# Patient Record
Sex: FEMALE
[De-identification: names, ages, dates, MRNs, and addresses within clinical notes are randomized per-mention and may not be internally consistent; named-entity substitution may affect disease eponyms.]

---

## 2020-11-23 ENCOUNTER — FORM ENCOUNTER (OUTPATIENT)
Age: 65
End: 2020-11-23

## 2020-11-24 ENCOUNTER — APPOINTMENT (OUTPATIENT)
Dept: PULMONOLOGY | Facility: CLINIC | Age: 65
End: 2020-11-24
Payer: MEDICARE

## 2020-11-24 ENCOUNTER — NON-APPOINTMENT (OUTPATIENT)
Age: 65
End: 2020-11-24

## 2020-11-24 DIAGNOSIS — U07.1 COVID-19: ICD-10-CM

## 2020-11-24 PROBLEM — Z00.00 ENCOUNTER FOR PREVENTIVE HEALTH EXAMINATION: Status: ACTIVE | Noted: 2020-11-24

## 2020-11-24 PROCEDURE — 99204 OFFICE O/P NEW MOD 45 MIN: CPT | Mod: CS,95

## 2020-11-24 NOTE — HISTORY OF PRESENT ILLNESS
[Home] : at home, [unfilled] , at the time of the visit. [Medical Office: ([facility])___] : at the medical office located in  [Family Member] : family member [Verbal consent obtained from patient] : the patient, [unfilled] [FreeTextEntry1] : Video via [employer].  Patient's daughter present\par \par 65-year-old woman referred to our [facility] program for presumed COVID-19 infection.\par \par Patient's  was diagnosed with Covid last week.  She has been having fevers since November 19 as well.  Overall, she feels "like I have a cold."  But no worse than that.  She does not routinely check her temperature so she does not think she still has fever, although her daughter notes she is walking around the sweater and does feel chilled.\par Her oxygen saturation has always maintained 96% or higher.  She denies any shortness of breath cards dyspnea with exertion.  She has a good appetite and taking p.o. intake.\par \par Patient has a past medical history of diabetes, for which she is on Metformin.  Daughter reports reasonably good glucose control.\par She also apparently has a history of hypertension hyperlipidemia but has refused treatment for these conditions.\par Additionally, about 5 years ago she was admitted to the hospital with what sounds like sepsis from some kind of infected renal cyst which was drained.  She was diagnosed at the time of kidney stones, and also with pulmonary emboli for which she was treated with several months of anticoagulation.  Currently she is not on anticoagulation.\par \par On video examination, the patient appears well and in no distress.  She appears somewhat obese.  Her oxygen saturation is 96% on room air.  She is speaking clearly without any respiratory distress.\par \par Patient has been refusing Covid testing.\par \par Summary, this is a 65-year-old woman, with a history of hypertension, diabetes, hyperlipidemia, as well as what sounds like was a provoked pulmonary embolism about 5 or 6 years ago associated with sepsis, with almost certain COVID-19, symptomatic since November 19.  Her  became symptomatic 2 days prior to her and has since been confirmed positive.\par Is currently stable, but I would course like to send labs on her, particularly a D-dimer given her prior history of pulmonary embolism.\par Phlebotomist will go to her home today to check her blood work.  However, I did not order the Covid PCR, because patient continues to refuse.  In order Covid IgG, may be positive at this point

## 2020-11-26 ENCOUNTER — APPOINTMENT (OUTPATIENT)
Dept: PULMONOLOGY | Facility: CLINIC | Age: 65
End: 2020-11-26
Payer: MEDICARE

## 2020-11-26 ENCOUNTER — NON-APPOINTMENT (OUTPATIENT)
Age: 65
End: 2020-11-26

## 2020-11-26 LAB
ALBUMIN SERPL ELPH-MCNC: 4 G/DL
ALP BLD-CCNC: 65 U/L
ALT SERPL-CCNC: 12 U/L
ANION GAP SERPL CALC-SCNC: 15 MMOL/L
AST SERPL-CCNC: 14 U/L
BASOPHILS # BLD AUTO: 0.01 K/UL
BASOPHILS NFR BLD AUTO: 0.2 %
BILIRUB SERPL-MCNC: <0.2 MG/DL
BUN SERPL-MCNC: 17 MG/DL
CALCIUM SERPL-MCNC: 8.6 MG/DL
CHLORIDE SERPL-SCNC: 104 MMOL/L
CO2 SERPL-SCNC: 20 MMOL/L
CREAT SERPL-MCNC: 0.91 MG/DL
CRP SERPL-MCNC: 2.5 MG/DL
DEPRECATED D DIMER PPP IA-ACNC: 175 NG/ML DDU
EOSINOPHIL # BLD AUTO: 0.02 K/UL
EOSINOPHIL NFR BLD AUTO: 0.5 %
FERRITIN SERPL-MCNC: 70 NG/ML
GLUCOSE SERPL-MCNC: 166 MG/DL
HCT VFR BLD CALC: 36.4 %
HGB BLD-MCNC: 11.3 G/DL
IMM GRANULOCYTES NFR BLD AUTO: 0 %
LYMPHOCYTES # BLD AUTO: 1.54 K/UL
LYMPHOCYTES NFR BLD AUTO: 36.8 %
MAN DIFF?: NORMAL
MCHC RBC-ENTMCNC: 26.6 PG
MCHC RBC-ENTMCNC: 31 GM/DL
MCV RBC AUTO: 85.6 FL
MONOCYTES # BLD AUTO: 0.37 K/UL
MONOCYTES NFR BLD AUTO: 8.9 %
NEUTROPHILS # BLD AUTO: 2.24 K/UL
NEUTROPHILS NFR BLD AUTO: 53.6 %
PLATELET # BLD AUTO: 168 K/UL
POTASSIUM SERPL-SCNC: 4.3 MMOL/L
PROCALCITONIN SERPL-MCNC: 0.05 NG/ML
PROT SERPL-MCNC: 6.8 G/DL
RBC # BLD: 4.25 M/UL
RBC # FLD: 14.8 %
SODIUM SERPL-SCNC: 139 MMOL/L
WBC # FLD AUTO: 4.18 K/UL

## 2020-11-26 PROCEDURE — 99441: CPT | Mod: CS,95

## 2020-11-26 NOTE — HISTORY OF PRESENT ILLNESS
[Home] : at home, [unfilled] , at the time of the visit. [Medical Office: ([facility])___] : at the medical office located in  [Family Member] : family member [Verbal consent obtained from patient] : the patient, [unfilled] [FreeTextEntry1] : COVID f/u\par daughter present\par \par feels about the same. Achy. no fever. no sob. pulse ox normal.\par \par labs reviewed -- normal d dimer, minimal elevation in CRP

## 2020-11-27 ENCOUNTER — NON-APPOINTMENT (OUTPATIENT)
Age: 65
End: 2020-11-27

## 2020-11-27 ENCOUNTER — APPOINTMENT (OUTPATIENT)
Dept: PULMONOLOGY | Facility: CLINIC | Age: 65
End: 2020-11-27
Payer: MEDICARE

## 2020-11-27 ENCOUNTER — APPOINTMENT (OUTPATIENT)
Dept: ULTRASOUND IMAGING | Facility: IMAGING CENTER | Age: 65
End: 2020-11-27

## 2020-11-27 LAB
SARS-COV-2 IGG SERPL IA-ACNC: 0.08 INDEX
SARS-COV-2 IGG SERPL QL IA: NEGATIVE

## 2020-11-27 PROCEDURE — 99214 OFFICE O/P EST MOD 30 MIN: CPT | Mod: CS,95

## 2020-11-27 PROCEDURE — 99204 OFFICE O/P NEW MOD 45 MIN: CPT | Mod: CS,95

## 2020-11-27 NOTE — HISTORY OF PRESENT ILLNESS
[Home] : at home, [unfilled] , at the time of the visit. [Medical Office: ([facility])___] : at the medical office located in  [Verbal consent obtained from patient] : the patient, [unfilled] [FreeTextEntry1] : covid f/u\par doximity video\par daughter and son present\par \par afebrile. normal 02 sat\par myalgias\par c/o right calf pain, needing warm compresses.\par d dimer is normal from yesterday , however, given her prior h/o provoked DVT and now COVID, high clinical suspicion\par Arranging for LE doppler for today (hopefully pt will agree)\par f/u d dimer as well

## 2020-11-28 ENCOUNTER — LABORATORY RESULT (OUTPATIENT)
Age: 65
End: 2020-11-28

## 2020-11-29 ENCOUNTER — APPOINTMENT (OUTPATIENT)
Dept: PULMONOLOGY | Facility: CLINIC | Age: 65
End: 2020-11-29
Payer: MEDICARE

## 2020-11-29 ENCOUNTER — NON-APPOINTMENT (OUTPATIENT)
Age: 65
End: 2020-11-29

## 2020-11-29 PROCEDURE — 99213 OFFICE O/P EST LOW 20 MIN: CPT | Mod: CS,95

## 2020-11-29 NOTE — HISTORY OF PRESENT ILLNESS
[Home] : at home, [unfilled] , at the time of the visit. [Medical Office: ([facility])___] : at the medical office located in  [Spouse] : spouse [Family Member] : family member [FreeTextEntry1] : f/u COVID telehealth\par \par son and  present\par and i spoke with RN this am\par \par Still febrile intermittently\par No SOB\par Normal 02 sat.\par good po intake\par myalgias. some intermittent calf pain. per RN, no edema or erythema and nontender on exam\par \par Labs OK, dimer 237\par \par She will have her LE doppler done tomorrow at an outside facility -- script e-faxed

## 2020-11-30 ENCOUNTER — APPOINTMENT (OUTPATIENT)
Dept: ULTRASOUND IMAGING | Facility: IMAGING CENTER | Age: 65
End: 2020-11-30

## 2020-11-30 ENCOUNTER — NON-APPOINTMENT (OUTPATIENT)
Age: 65
End: 2020-11-30

## 2020-11-30 ENCOUNTER — APPOINTMENT (OUTPATIENT)
Dept: PULMONOLOGY | Facility: CLINIC | Age: 65
End: 2020-11-30
Payer: MEDICARE

## 2020-11-30 PROCEDURE — 99213 OFFICE O/P EST LOW 20 MIN: CPT | Mod: CS,95

## 2020-11-30 NOTE — HISTORY OF PRESENT ILLNESS
[Home] : at home, [unfilled] , at the time of the visit. [Medical Office: ([facility])___] : at the medical office located in  [Spouse] : spouse [Family Member] : family member [Verbal consent obtained from patient] : the patient, [unfilled] [FreeTextEntry1] : telehealth f/u\par \par presumed COVID\par Feeling better today\par No fever\par Normal 02 sat\par Leg pain resolved.\par She went for the DVT study -- but had to wait more than an hour in the care, and then was told the equipment wasn’t ready? so she came home\par \par Labs were done today.\par Will f/u d dimer\par \par Presumed COVID - fevers, myalgias, elevated CRP, and  has COVID. Pt refused PCR testing. Ab last week were neg -- plan to repeat next week

## 2020-12-01 ENCOUNTER — NON-APPOINTMENT (OUTPATIENT)
Age: 65
End: 2020-12-01

## 2020-12-03 ENCOUNTER — APPOINTMENT (OUTPATIENT)
Dept: PULMONOLOGY | Facility: CLINIC | Age: 65
End: 2020-12-03
Payer: MEDICARE

## 2020-12-03 ENCOUNTER — NON-APPOINTMENT (OUTPATIENT)
Age: 65
End: 2020-12-03

## 2020-12-03 PROCEDURE — 99213 OFFICE O/P EST LOW 20 MIN: CPT | Mod: CS,95

## 2020-12-03 NOTE — HISTORY OF PRESENT ILLNESS
[Home] : at home, [unfilled] , at the time of the visit. [Medical Office: ([facility])___] : at the medical office located in  [Family Member] : family member [Verbal consent obtained from patient] : the patient, [unfilled] [FreeTextEntry1] : COVID f/u\par DaughterLuna\par \par Pt is feeling better\par No fevers.\par No SOB\par normal pulse ox\par good po\par no further leg pain\par \par Labs -- normal d dimer\par \par doing well\par \par f/u as needed